# Patient Record
Sex: MALE | Race: WHITE | NOT HISPANIC OR LATINO | URBAN - METROPOLITAN AREA
[De-identification: names, ages, dates, MRNs, and addresses within clinical notes are randomized per-mention and may not be internally consistent; named-entity substitution may affect disease eponyms.]

---

## 2024-05-07 ENCOUNTER — APPOINTMENT (OUTPATIENT)
Dept: URGENT CARE | Age: 54
End: 2024-05-07

## 2025-02-11 ENCOUNTER — TELEPHONE (OUTPATIENT)
Dept: GASTROENTEROLOGY | Facility: CLINIC | Age: 55
End: 2025-02-11

## 2025-02-19 ENCOUNTER — PREP FOR PROCEDURE (OUTPATIENT)
Age: 55
End: 2025-02-19

## 2025-02-19 DIAGNOSIS — Z86.0100 HISTORY OF COLON POLYPS: Primary | ICD-10-CM

## 2025-02-19 NOTE — TELEPHONE ENCOUNTER
02/19/25  Screened by: Kerline Velazquez    Referring Provider     Pre- Screening:     BMI   Has patient been referred for a routine screening Colonoscopy? yes  Is the patient between 45-75 years old? yes      Previous Colonoscopy yes   If yes:    Date: 2020    Facility:     Reason:       Does the patient want to see a Gastroenterologist prior to their procedure OR are they having any GI symptoms? no    Has the patient been hospitalized or had abdominal surgery in the past 6 months? no    Does the patient use supplemental oxygen? no    Does the patient take Coumadin, Lovenox, Plavix, Elliquis, Xarelto, or other blood thinning medication? no    Has the patient had a stroke, cardiac event, or stent placed in the past year? no      If patient is between 45yrs - 49yrs, please advise patient that we will have to confirm benefits & coverage with their insurance company for a routine screening colonoscopy.

## 2025-02-19 NOTE — TELEPHONE ENCOUNTER
Scheduled date of colonoscopy (as of today): 5/2   Physician performing colonoscopy: JENNY   Location of colonoscopy: [facility]   Bowel prep reviewed with patient: MALINI/SENDY   Instructions reviewed with patient by: [email or web address]  Clearances: NONE

## 2025-04-18 ENCOUNTER — ANESTHESIA EVENT (OUTPATIENT)
Dept: ANESTHESIOLOGY | Facility: HOSPITAL | Age: 55
End: 2025-04-18

## 2025-04-18 ENCOUNTER — ANESTHESIA (OUTPATIENT)
Dept: ANESTHESIOLOGY | Facility: HOSPITAL | Age: 55
End: 2025-04-18

## 2025-04-22 ENCOUNTER — TELEPHONE (OUTPATIENT)
Age: 55
End: 2025-04-22

## 2025-04-22 NOTE — TELEPHONE ENCOUNTER
Spoke with pt confirming his 5/2 procedure with Dr. Orellana. He has his prep and his  arranged. He will be called the day prior with his arrival time.

## 2025-05-01 RX ORDER — SODIUM CHLORIDE, SODIUM LACTATE, POTASSIUM CHLORIDE, CALCIUM CHLORIDE 600; 310; 30; 20 MG/100ML; MG/100ML; MG/100ML; MG/100ML
125 INJECTION, SOLUTION INTRAVENOUS CONTINUOUS
Status: CANCELLED | OUTPATIENT
Start: 2025-05-01

## 2025-05-02 ENCOUNTER — ANESTHESIA EVENT (OUTPATIENT)
Dept: GASTROENTEROLOGY | Facility: AMBULARY SURGERY CENTER | Age: 55
End: 2025-05-02
Payer: COMMERCIAL

## 2025-05-02 ENCOUNTER — HOSPITAL ENCOUNTER (OUTPATIENT)
Dept: GASTROENTEROLOGY | Facility: AMBULARY SURGERY CENTER | Age: 55
Setting detail: OUTPATIENT SURGERY
End: 2025-05-02
Attending: INTERNAL MEDICINE
Payer: COMMERCIAL

## 2025-05-02 VITALS
TEMPERATURE: 96.7 F | OXYGEN SATURATION: 97 % | BODY MASS INDEX: 37.49 KG/M2 | RESPIRATION RATE: 14 BRPM | HEIGHT: 65 IN | DIASTOLIC BLOOD PRESSURE: 79 MMHG | HEART RATE: 80 BPM | WEIGHT: 225 LBS | SYSTOLIC BLOOD PRESSURE: 125 MMHG

## 2025-05-02 DIAGNOSIS — Z86.0100 HISTORY OF COLON POLYPS: ICD-10-CM

## 2025-05-02 DIAGNOSIS — Z00.6 ENCOUNTER FOR EXAMINATION FOR NORMAL COMPARISON OR CONTROL IN CLINICAL RESEARCH PROGRAM: ICD-10-CM

## 2025-05-02 PROBLEM — M54.50 LOW BACK PAIN AT MULTIPLE SITES: Status: ACTIVE | Noted: 2021-05-20

## 2025-05-02 PROCEDURE — G0121 COLON CA SCRN NOT HI RSK IND: HCPCS | Performed by: INTERNAL MEDICINE

## 2025-05-02 RX ORDER — PROPOFOL 10 MG/ML
INJECTION, EMULSION INTRAVENOUS CONTINUOUS PRN
Status: DISCONTINUED | OUTPATIENT
Start: 2025-05-02 | End: 2025-05-02

## 2025-05-02 RX ORDER — PROPOFOL 10 MG/ML
INJECTION, EMULSION INTRAVENOUS AS NEEDED
Status: DISCONTINUED | OUTPATIENT
Start: 2025-05-02 | End: 2025-05-02

## 2025-05-02 RX ORDER — SODIUM CHLORIDE, SODIUM LACTATE, POTASSIUM CHLORIDE, CALCIUM CHLORIDE 600; 310; 30; 20 MG/100ML; MG/100ML; MG/100ML; MG/100ML
125 INJECTION, SOLUTION INTRAVENOUS CONTINUOUS
Status: DISPENSED | OUTPATIENT
Start: 2025-05-02

## 2025-05-02 RX ORDER — LIDOCAINE HYDROCHLORIDE 10 MG/ML
INJECTION, SOLUTION EPIDURAL; INFILTRATION; INTRACAUDAL; PERINEURAL AS NEEDED
Status: DISCONTINUED | OUTPATIENT
Start: 2025-05-02 | End: 2025-05-02

## 2025-05-02 RX ORDER — SODIUM CHLORIDE, SODIUM LACTATE, POTASSIUM CHLORIDE, CALCIUM CHLORIDE 600; 310; 30; 20 MG/100ML; MG/100ML; MG/100ML; MG/100ML
INJECTION, SOLUTION INTRAVENOUS CONTINUOUS PRN
Status: DISCONTINUED | OUTPATIENT
Start: 2025-05-02 | End: 2025-05-02

## 2025-05-02 RX ORDER — SIMVASTATIN 20 MG
20 TABLET ORAL
COMMUNITY

## 2025-05-02 RX ORDER — ASPIRIN 81 MG/1
81 TABLET, CHEWABLE ORAL DAILY
COMMUNITY

## 2025-05-02 RX ADMIN — LIDOCAINE HYDROCHLORIDE 50 MG: 10 INJECTION, SOLUTION EPIDURAL; INFILTRATION; INTRACAUDAL at 07:36

## 2025-05-02 RX ADMIN — PROPOFOL 140 MCG/KG/MIN: 10 INJECTION, EMULSION INTRAVENOUS at 07:36

## 2025-05-02 RX ADMIN — PROPOFOL 100 MG: 10 INJECTION, EMULSION INTRAVENOUS at 07:36

## 2025-05-02 RX ADMIN — SODIUM CHLORIDE, SODIUM LACTATE, POTASSIUM CHLORIDE, AND CALCIUM CHLORIDE: .6; .31; .03; .02 INJECTION, SOLUTION INTRAVENOUS at 07:33

## 2025-05-02 NOTE — H&P
"History and Physical -  [facility]  Home Casarez 54 y.o. male MRN: [id number]    HPI: Home Casarez is a 54 y.o. year old male who presents with hx of colon polyps      Review of Systems    Historical Information   History reviewed. No pertinent past medical history.  History reviewed. No pertinent surgical history.  Social History   Social History     Substance and Sexual Activity   Alcohol Use Yes    Comment: social     Social History     Substance and Sexual Activity   Drug Use Never     Social History     Tobacco Use   Smoking Status Former    Current packs/day: 0.00    Types: Cigarettes    Quit date:     Years since quittin.3   Smokeless Tobacco Never     Family History   Problem Relation Age of Onset    Heart disease Mother     Heart disease Father        Meds/Allergies     Not in a hospital admission.    Allergies   Allergen Reactions    Wellbutrin [Bupropion] Dizziness and Headache       Objective     /71   Pulse 80   Temp (!) 97 °F (36.1 °C) (Temporal)   Resp 16   Ht 5' 5\" (1.651 m)   Wt 102 kg (225 lb)   SpO2 98%   BMI 37.44 kg/m²       PHYSICAL EXAM    Gen: NAD  CV: RRR  CHEST: Clear  ABD: soft, NT/ND  EXT: no edema  Neuro: AAO      ASSESSMENT/PLAN:  This is a 54 y.o. year old male here for hx of colon polyps      PLAN:   Procedure: colonoscopy      "

## 2025-05-02 NOTE — ANESTHESIA POSTPROCEDURE EVALUATION
Post-Op Assessment Note    CV Status:  Stable    Pain management: adequate       Mental Status:  Alert and awake   Hydration Status:  Stable   PONV Controlled:  None   Airway Patency:  Patent     Post Op Vitals Reviewed: Yes    No anethesia notable event occurred.    Staff: Anesthesiologist           Last Filed PACU Vitals:  Vitals Value Taken Time   Temp 96.7 °F (35.9 °C) 05/02/25 0751   Pulse 80 05/02/25 0813   /79 05/02/25 0813   Resp 14 05/02/25 0813   SpO2 97 % 05/02/25 0813

## 2025-05-02 NOTE — ANESTHESIA PREPROCEDURE EVALUATION
"Procedure:  COLONOSCOPY    Relevant Problems   ANESTHESIA (within normal limits)      CARDIO (within normal limits)   (+) Mixed hyperlipidemia      ENDO (within normal limits)      GI/HEPATIC (within normal limits)      /RENAL (within normal limits)      GYN (within normal limits)      HEMATOLOGY (within normal limits)      MUSCULOSKELETAL (within normal limits)      NEURO/PSYCH (within normal limits)      PULMONARY (within normal limits)      Behavioral Health   (+) History of tobacco use      No results found for: \"WBC\", \"HGB\", \"HCT\", \"MCV\", \"PLT\"  Lab Results   Component Value Date    SODIUM 138 03/07/2025    K 4 03/07/2025    CL 99 (L) 03/07/2025    CO2 29 03/07/2025    AGAP 10 03/07/2025    BUN 12 03/07/2025    CREATININE 0.86 03/07/2025    GLUC 105 (H) 03/07/2025    CALCIUM 9.9 03/07/2025    AST 35 03/07/2025    ALT 47 03/07/2025    ALKPHOS 71 03/07/2025    TP 7.6 03/07/2025    TBILI 1.1 (H) 03/07/2025    EGFR 101 03/07/2025       Physical Exam    Airway    Mallampati score: II  TM Distance: >3 FB  Neck ROM: full     Dental       Cardiovascular  Cardiovascular exam normal    Pulmonary  Pulmonary exam normal     Other Findings        Anesthesia Plan  ASA Score- 2     Anesthesia Type- IV sedation with anesthesia with ASA Monitors.         Additional Monitors:     Airway Plan:            Plan Factors-Exercise tolerance (METS): >4 METS.    Chart reviewed.   Existing labs reviewed. Patient summary reviewed.                  Induction- intravenous.    Postoperative Plan-         Informed Consent- Anesthetic plan and risks discussed with patient.  I personally reviewed this patient with the CRNA. Discussed and agreed on the Anesthesia Plan with the CRNA..      NPO Status:  Vitals Value Taken Time   Date of last liquid 05/02/25 05/02/25 0705   Time of last liquid 0001 05/02/25 0705   Date of last solid 04/30/25 05/02/25 0705   Time of last solid 1900 05/02/25 0705         "